# Patient Record
Sex: FEMALE | Race: ASIAN | NOT HISPANIC OR LATINO
[De-identification: names, ages, dates, MRNs, and addresses within clinical notes are randomized per-mention and may not be internally consistent; named-entity substitution may affect disease eponyms.]

---

## 2024-08-27 ENCOUNTER — APPOINTMENT (OUTPATIENT)
Dept: OBGYN | Facility: CLINIC | Age: 39
End: 2024-08-27
Payer: COMMERCIAL

## 2024-08-27 VITALS — DIASTOLIC BLOOD PRESSURE: 78 MMHG | WEIGHT: 171.13 LBS | SYSTOLIC BLOOD PRESSURE: 119 MMHG

## 2024-08-27 DIAGNOSIS — Z85.850 PERSONAL HISTORY OF MALIGNANT NEOPLASM OF THYROID: ICD-10-CM

## 2024-08-27 DIAGNOSIS — Z78.9 OTHER SPECIFIED HEALTH STATUS: ICD-10-CM

## 2024-08-27 DIAGNOSIS — D25.9 LEIOMYOMA OF UTERUS, UNSPECIFIED: ICD-10-CM

## 2024-08-27 DIAGNOSIS — Z34.92 ENCOUNTER FOR SUPERVISION OF NORMAL PREGNANCY, UNSPECIFIED, SECOND TRIMESTER: ICD-10-CM

## 2024-08-27 DIAGNOSIS — Z83.42 FAMILY HISTORY OF FAMILIAL HYPERCHOLESTEROLEMIA: ICD-10-CM

## 2024-08-27 DIAGNOSIS — Z83.3 FAMILY HISTORY OF DIABETES MELLITUS: ICD-10-CM

## 2024-08-27 PROBLEM — Z00.00 ENCOUNTER FOR PREVENTIVE HEALTH EXAMINATION: Status: ACTIVE | Noted: 2024-08-27

## 2024-08-27 LAB
BILIRUB UR QL STRIP: NORMAL
GLUCOSE UR-MCNC: NORMAL
HCG UR QL: 0.2 EU/DL
HGB UR QL STRIP.AUTO: NORMAL
KETONES UR-MCNC: NORMAL
LEUKOCYTE ESTERASE UR QL STRIP: NORMAL
NITRITE UR QL STRIP: NORMAL
PH UR STRIP: 6
PROT UR STRIP-MCNC: NORMAL
SP GR UR STRIP: 1.01

## 2024-08-27 PROCEDURE — 0501F PRENATAL FLOW SHEET: CPT

## 2024-08-27 RX ORDER — ASPIRIN 81 MG/1
81 TABLET, DELAYED RELEASE ORAL
Refills: 0 | Status: ACTIVE | COMMUNITY
Start: 2024-08-27

## 2024-08-27 RX ORDER — CALCIUM CARBONATE/VITAMIN D3 500MG-5MCG
500-5 TABLET ORAL
Refills: 0 | Status: ACTIVE | COMMUNITY
Start: 2024-08-27

## 2024-08-27 RX ORDER — CALCITRIOL 0.25 UG/1
0.25 CAPSULE, LIQUID FILLED ORAL
Refills: 0 | Status: ACTIVE | COMMUNITY
Start: 2024-08-27

## 2024-08-27 RX ORDER — LEVOTHYROXINE SODIUM 0.15 MG/1
150 TABLET ORAL
Refills: 0 | Status: ACTIVE | COMMUNITY
Start: 2024-08-27

## 2024-08-29 ENCOUNTER — NON-APPOINTMENT (OUTPATIENT)
Age: 39
End: 2024-08-29

## 2024-08-29 PROBLEM — D25.9 FIBROID UTERUS: Status: ACTIVE | Noted: 2024-08-29

## 2024-08-29 LAB
ABO + RH PNL BLD: NORMAL
AF-AFP DISCLAIMER: NORMAL
AFP  MOM: 1.28
AFP CONCENTRATION: 53.92 NG/ML
AFP INTERPRETATION: NORMAL
AFP MOM CUT-OFF: 2.5
AFP PERCENTILE: 77.8
AFP SCREENING RESULT: NORMAL
AFTER SCREENING RISK OPEN SPINA BIFIDA: NORMAL
BEFORE SCREENING RISK OPEN SPINA BIFIDA: NORMAL
BLD GP AB SCN SERPL QL: NORMAL
CARBAMAZEPINE?: NO
CURRENT SMOKER: NORMAL
ESTIMATED DUE DATE: NORMAL
EXTREME ANALYTE ALERT: NO
FAMILY HISTORY OPEN SPINA BIFIDA: NORMAL
GESTATIONAL  AGE: NORMAL
GESTATIONAL AGE METHOD: NORMAL
HBV SURFACE AG SER QL: NONREACTIVE
HCV AB SER QL: NONREACTIVE
HCV S/CO RATIO: 0.17 S/CO
HIV1+2 AB SPEC QL IA.RAPID: NONREACTIVE
INSULIN DEPEND DIABETES: NORMAL
MATERNAL WGT: 171.2
MULTIPLE PREGNANCY STATUS: NORMAL
RACE/ETHNICITY: NORMAL
RUBV IGG FLD-ACNC: 3.44 INDEX
RUBV IGG SER-IMP: POSITIVE
T PALLIDUM AB SER QL IA: NEGATIVE
VALPROIC ACID?: NORMAL

## 2024-09-02 ENCOUNTER — TRANSCRIPTION ENCOUNTER (OUTPATIENT)
Age: 39
End: 2024-09-02

## 2024-09-16 ENCOUNTER — APPOINTMENT (OUTPATIENT)
Dept: ANTEPARTUM | Facility: CLINIC | Age: 39
End: 2024-09-16
Payer: COMMERCIAL

## 2024-09-16 ENCOUNTER — ASOB RESULT (OUTPATIENT)
Age: 39
End: 2024-09-16

## 2024-09-16 ENCOUNTER — NON-APPOINTMENT (OUTPATIENT)
Age: 39
End: 2024-09-16

## 2024-09-16 PROCEDURE — 76811 OB US DETAILED SNGL FETUS: CPT

## 2024-09-26 ENCOUNTER — NON-APPOINTMENT (OUTPATIENT)
Age: 39
End: 2024-09-26

## 2024-09-26 ENCOUNTER — APPOINTMENT (OUTPATIENT)
Dept: OBGYN | Facility: CLINIC | Age: 39
End: 2024-09-26
Payer: COMMERCIAL

## 2024-09-26 VITALS
SYSTOLIC BLOOD PRESSURE: 115 MMHG | BODY MASS INDEX: 31.68 KG/M2 | WEIGHT: 172.13 LBS | HEIGHT: 62 IN | DIASTOLIC BLOOD PRESSURE: 73 MMHG

## 2024-09-26 PROCEDURE — 81003 URINALYSIS AUTO W/O SCOPE: CPT | Mod: QW

## 2024-09-26 PROCEDURE — 0502F SUBSEQUENT PRENATAL CARE: CPT

## 2024-09-27 ENCOUNTER — APPOINTMENT (OUTPATIENT)
Dept: ANTEPARTUM | Facility: CLINIC | Age: 39
End: 2024-09-27
Payer: COMMERCIAL

## 2024-09-27 ENCOUNTER — ASOB RESULT (OUTPATIENT)
Age: 39
End: 2024-09-27

## 2024-09-27 PROCEDURE — 76816 OB US FOLLOW-UP PER FETUS: CPT

## 2024-10-17 ENCOUNTER — ASOB RESULT (OUTPATIENT)
Age: 39
End: 2024-10-17

## 2024-10-17 ENCOUNTER — APPOINTMENT (OUTPATIENT)
Dept: ANTEPARTUM | Facility: CLINIC | Age: 39
End: 2024-10-17
Payer: COMMERCIAL

## 2024-10-17 PROCEDURE — 76816 OB US FOLLOW-UP PER FETUS: CPT

## 2024-10-24 ENCOUNTER — APPOINTMENT (OUTPATIENT)
Dept: OBGYN | Facility: CLINIC | Age: 39
End: 2024-10-24
Payer: COMMERCIAL

## 2024-10-24 VITALS
WEIGHT: 180.5 LBS | DIASTOLIC BLOOD PRESSURE: 83 MMHG | SYSTOLIC BLOOD PRESSURE: 124 MMHG | BODY MASS INDEX: 34.08 KG/M2 | HEIGHT: 61 IN

## 2024-10-24 DIAGNOSIS — Z34.92 ENCOUNTER FOR SUPERVISION OF NORMAL PREGNANCY, UNSPECIFIED, SECOND TRIMESTER: ICD-10-CM

## 2024-10-24 DIAGNOSIS — Z34.93 ENCOUNTER FOR SUPERVISION OF NORMAL PREGNANCY, UNSPECIFIED, THIRD TRIMESTER: ICD-10-CM

## 2024-10-24 LAB
BILIRUB UR QL STRIP: NORMAL
GLUCOSE UR-MCNC: NORMAL
HCG UR QL: 0.2 EU/DL
HGB UR QL STRIP.AUTO: NORMAL
KETONES UR-MCNC: NORMAL
LEUKOCYTE ESTERASE UR QL STRIP: NORMAL
NITRITE UR QL STRIP: NORMAL
PH UR STRIP: 7
PROT UR STRIP-MCNC: NORMAL
SP GR UR STRIP: 1.01

## 2024-10-24 PROCEDURE — 81003 URINALYSIS AUTO W/O SCOPE: CPT | Mod: QW

## 2024-10-24 PROCEDURE — 0502F SUBSEQUENT PRENATAL CARE: CPT

## 2024-10-25 LAB
BASOPHILS # BLD AUTO: 0.07 K/UL
BASOPHILS NFR BLD AUTO: 0.6 %
EOSINOPHIL # BLD AUTO: 0.23 K/UL
EOSINOPHIL NFR BLD AUTO: 1.8 %
GLUCOSE 1H P 50 G GLC PO SERPL-MCNC: 125 MG/DL
HCT VFR BLD CALC: 36 %
HGB BLD-MCNC: 11.1 G/DL
HIV1+2 AB SPEC QL IA.RAPID: NONREACTIVE
IMM GRANULOCYTES NFR BLD AUTO: 1 %
LYMPHOCYTES # BLD AUTO: 1.62 K/UL
LYMPHOCYTES NFR BLD AUTO: 12.8 %
MAN DIFF?: NORMAL
MCHC RBC-ENTMCNC: 28.6 PG
MCHC RBC-ENTMCNC: 30.8 GM/DL
MCV RBC AUTO: 92.8 FL
MONOCYTES # BLD AUTO: 0.62 K/UL
MONOCYTES NFR BLD AUTO: 4.9 %
NEUTROPHILS # BLD AUTO: 9.96 K/UL
NEUTROPHILS NFR BLD AUTO: 78.9 %
PLATELET # BLD AUTO: 247 K/UL
RBC # BLD: 3.88 M/UL
RBC # FLD: 13.2 %
T PALLIDUM AB SER QL IA: NEGATIVE
T4 FREE SERPL-MCNC: 1.4 NG/DL
TSH SERPL-ACNC: 0.34 UIU/ML
WBC # FLD AUTO: 12.62 K/UL

## 2024-10-28 ENCOUNTER — TRANSCRIPTION ENCOUNTER (OUTPATIENT)
Age: 39
End: 2024-10-28

## 2024-10-29 ENCOUNTER — NON-APPOINTMENT (OUTPATIENT)
Age: 39
End: 2024-10-29

## 2024-10-30 ENCOUNTER — TRANSCRIPTION ENCOUNTER (OUTPATIENT)
Age: 39
End: 2024-10-30

## 2024-11-14 ENCOUNTER — TRANSCRIPTION ENCOUNTER (OUTPATIENT)
Age: 39
End: 2024-11-14

## 2024-11-27 ENCOUNTER — APPOINTMENT (OUTPATIENT)
Dept: OBGYN | Facility: CLINIC | Age: 39
End: 2024-11-27

## 2024-11-27 ENCOUNTER — APPOINTMENT (OUTPATIENT)
Dept: ANTEPARTUM | Facility: CLINIC | Age: 39
End: 2024-11-27
Payer: COMMERCIAL

## 2024-11-27 ENCOUNTER — ASOB RESULT (OUTPATIENT)
Age: 39
End: 2024-11-27

## 2024-11-27 VITALS
HEIGHT: 61 IN | SYSTOLIC BLOOD PRESSURE: 114 MMHG | WEIGHT: 188 LBS | BODY MASS INDEX: 35.5 KG/M2 | DIASTOLIC BLOOD PRESSURE: 77 MMHG

## 2024-11-27 PROCEDURE — 76816 OB US FOLLOW-UP PER FETUS: CPT

## 2024-11-27 PROCEDURE — 90715 TDAP VACCINE 7 YRS/> IM: CPT

## 2024-11-27 PROCEDURE — 90471 IMMUNIZATION ADMIN: CPT

## 2024-11-27 PROCEDURE — 76819 FETAL BIOPHYS PROFIL W/O NST: CPT | Mod: 59

## 2024-12-13 ENCOUNTER — NON-APPOINTMENT (OUTPATIENT)
Age: 39
End: 2024-12-13

## 2024-12-13 ENCOUNTER — TRANSCRIPTION ENCOUNTER (OUTPATIENT)
Age: 39
End: 2024-12-13

## 2024-12-16 ENCOUNTER — APPOINTMENT (OUTPATIENT)
Dept: OBGYN | Facility: CLINIC | Age: 39
End: 2024-12-16
Payer: COMMERCIAL

## 2024-12-16 VITALS
HEIGHT: 61 IN | DIASTOLIC BLOOD PRESSURE: 73 MMHG | WEIGHT: 193 LBS | BODY MASS INDEX: 36.44 KG/M2 | SYSTOLIC BLOOD PRESSURE: 118 MMHG

## 2024-12-16 DIAGNOSIS — Z23 ENCOUNTER FOR IMMUNIZATION: ICD-10-CM

## 2024-12-16 DIAGNOSIS — Z34.93 ENCOUNTER FOR SUPERVISION OF NORMAL PREGNANCY, UNSPECIFIED, THIRD TRIMESTER: ICD-10-CM

## 2024-12-16 PROCEDURE — 0502F SUBSEQUENT PRENATAL CARE: CPT

## 2024-12-16 PROCEDURE — 90471 IMMUNIZATION ADMIN: CPT

## 2024-12-16 PROCEDURE — 90678 RSV VACC PREF BIVALENT IM: CPT

## 2024-12-18 ENCOUNTER — NON-APPOINTMENT (OUTPATIENT)
Age: 39
End: 2024-12-18

## 2024-12-27 ENCOUNTER — APPOINTMENT (OUTPATIENT)
Dept: ANTEPARTUM | Facility: CLINIC | Age: 39
End: 2024-12-27
Payer: COMMERCIAL

## 2024-12-27 ENCOUNTER — ASOB RESULT (OUTPATIENT)
Age: 39
End: 2024-12-27

## 2024-12-27 PROCEDURE — 76818 FETAL BIOPHYS PROFILE W/NST: CPT | Mod: 59

## 2024-12-27 PROCEDURE — 76816 OB US FOLLOW-UP PER FETUS: CPT

## 2025-01-03 ENCOUNTER — ASOB RESULT (OUTPATIENT)
Age: 40
End: 2025-01-03

## 2025-01-03 ENCOUNTER — APPOINTMENT (OUTPATIENT)
Dept: OBGYN | Facility: CLINIC | Age: 40
End: 2025-01-03
Payer: COMMERCIAL

## 2025-01-03 ENCOUNTER — APPOINTMENT (OUTPATIENT)
Dept: ANTEPARTUM | Facility: CLINIC | Age: 40
End: 2025-01-03
Payer: COMMERCIAL

## 2025-01-03 VITALS
WEIGHT: 192.01 LBS | BODY MASS INDEX: 36.28 KG/M2 | SYSTOLIC BLOOD PRESSURE: 128 MMHG | DIASTOLIC BLOOD PRESSURE: 80 MMHG

## 2025-01-03 PROCEDURE — 0502F SUBSEQUENT PRENATAL CARE: CPT

## 2025-01-03 PROCEDURE — 76818 FETAL BIOPHYS PROFILE W/NST: CPT

## 2025-01-03 PROCEDURE — 59426 ANTEPARTUM CARE ONLY: CPT

## 2025-01-06 ENCOUNTER — NON-APPOINTMENT (OUTPATIENT)
Age: 40
End: 2025-01-06

## 2025-01-06 ENCOUNTER — INPATIENT (INPATIENT)
Facility: HOSPITAL | Age: 40
LOS: 3 days | Discharge: HOME | End: 2025-01-10
Attending: SPECIALIST | Admitting: SPECIALIST
Payer: COMMERCIAL

## 2025-01-06 VITALS
DIASTOLIC BLOOD PRESSURE: 69 MMHG | HEART RATE: 82 BPM | SYSTOLIC BLOOD PRESSURE: 112 MMHG | TEMPERATURE: 98 F | OXYGEN SATURATION: 99 % | RESPIRATION RATE: 18 BRPM

## 2025-01-06 DIAGNOSIS — Z34.80 ENCOUNTER FOR SUPERVISION OF OTHER NORMAL PREGNANCY, UNSPECIFIED TRIMESTER: ICD-10-CM

## 2025-01-06 DIAGNOSIS — O26.899 OTHER SPECIFIED PREGNANCY RELATED CONDITIONS, UNSPECIFIED TRIMESTER: ICD-10-CM

## 2025-01-06 DIAGNOSIS — Z98.890 OTHER SPECIFIED POSTPROCEDURAL STATES: Chronic | ICD-10-CM

## 2025-01-06 LAB
ALBUMIN SERPL ELPH-MCNC: 3.4 G/DL — SIGNIFICANT CHANGE UP (ref 3.3–5)
ALP SERPL-CCNC: 143 U/L — HIGH (ref 40–120)
ALT FLD-CCNC: 223 U/L — HIGH (ref 10–45)
ANION GAP SERPL CALC-SCNC: 13 MMOL/L — SIGNIFICANT CHANGE UP (ref 5–17)
AST SERPL-CCNC: 66 U/L — HIGH (ref 10–40)
BILIRUB SERPL-MCNC: 0.5 MG/DL — SIGNIFICANT CHANGE UP (ref 0.2–1.2)
BLD GP AB SCN SERPL QL: NEGATIVE — SIGNIFICANT CHANGE UP
BUN SERPL-MCNC: 9 MG/DL — SIGNIFICANT CHANGE UP (ref 7–23)
CALCIUM SERPL-MCNC: 9.1 MG/DL — SIGNIFICANT CHANGE UP (ref 8.4–10.5)
CHLORIDE SERPL-SCNC: 102 MMOL/L — SIGNIFICANT CHANGE UP (ref 96–108)
CO2 SERPL-SCNC: 20 MMOL/L — LOW (ref 22–31)
CREAT SERPL-MCNC: 0.44 MG/DL — LOW (ref 0.5–1.3)
EGFR: 125 ML/MIN/1.73M2 — SIGNIFICANT CHANGE UP
GLUCOSE SERPL-MCNC: 107 MG/DL — HIGH (ref 70–99)
GP B STREP DNA SPEC QL NAA+PROBE: DETECTED
HCT VFR BLD CALC: 36.2 % — SIGNIFICANT CHANGE UP (ref 34.5–45)
HGB BLD-MCNC: 11.7 G/DL — SIGNIFICANT CHANGE UP (ref 11.5–15.5)
MCHC RBC-ENTMCNC: 28.5 PG — SIGNIFICANT CHANGE UP (ref 27–34)
MCHC RBC-ENTMCNC: 32.3 G/DL — SIGNIFICANT CHANGE UP (ref 32–36)
MCV RBC AUTO: 88.3 FL — SIGNIFICANT CHANGE UP (ref 80–100)
NRBC # BLD: 0 /100 WBCS — SIGNIFICANT CHANGE UP (ref 0–0)
PLATELET # BLD AUTO: 263 K/UL — SIGNIFICANT CHANGE UP (ref 150–400)
POTASSIUM SERPL-MCNC: 4.3 MMOL/L — SIGNIFICANT CHANGE UP (ref 3.5–5.3)
POTASSIUM SERPL-SCNC: 4.3 MMOL/L — SIGNIFICANT CHANGE UP (ref 3.5–5.3)
PROT SERPL-MCNC: 7.2 G/DL — SIGNIFICANT CHANGE UP (ref 6–8.3)
RBC # BLD: 4.1 M/UL — SIGNIFICANT CHANGE UP (ref 3.8–5.2)
RBC # FLD: 13.5 % — SIGNIFICANT CHANGE UP (ref 10.3–14.5)
RH IG SCN BLD-IMP: POSITIVE — SIGNIFICANT CHANGE UP
RH IG SCN BLD-IMP: POSITIVE — SIGNIFICANT CHANGE UP
SODIUM SERPL-SCNC: 135 MMOL/L — SIGNIFICANT CHANGE UP (ref 135–145)
SOURCE GBS: NORMAL
WBC # BLD: 11.91 K/UL — HIGH (ref 3.8–10.5)
WBC # FLD AUTO: 11.91 K/UL — HIGH (ref 3.8–10.5)

## 2025-01-06 RX ORDER — SODIUM CHLORIDE 9 MG/ML
1000 INJECTION, SOLUTION INTRAVENOUS
Refills: 0 | Status: DISCONTINUED | OUTPATIENT
Start: 2025-01-06 | End: 2025-01-08

## 2025-01-06 RX ORDER — FAMOTIDINE 20 MG/1
20 TABLET, FILM COATED ORAL ONCE
Refills: 0 | Status: DISCONTINUED | OUTPATIENT
Start: 2025-01-06 | End: 2025-01-06

## 2025-01-06 RX ORDER — AMPICILLIN TRIHYDRATE 125 MG/5ML
1 SUSPENSION, RECONSTITUTED, ORAL (ML) ORAL EVERY 4 HOURS
Refills: 0 | Status: DISCONTINUED | OUTPATIENT
Start: 2025-01-06 | End: 2025-01-08

## 2025-01-06 RX ORDER — CITRIC ACID/SODIUM CITRATE 334-500MG
30 SOLUTION, ORAL ORAL ONCE
Refills: 0 | Status: DISCONTINUED | OUTPATIENT
Start: 2025-01-06 | End: 2025-01-06

## 2025-01-06 RX ORDER — CHLORHEXIDINE GLUCONATE 1.2 MG/ML
1 RINSE ORAL DAILY
Refills: 0 | Status: DISCONTINUED | OUTPATIENT
Start: 2025-01-06 | End: 2025-01-06

## 2025-01-06 RX ORDER — SODIUM CHLORIDE 9 MG/ML
1000 INJECTION, SOLUTION INTRAVENOUS
Refills: 0 | Status: DISCONTINUED | OUTPATIENT
Start: 2025-01-06 | End: 2025-01-06

## 2025-01-06 RX ORDER — OXYTOCIN IN 5 % DEXTROSE 20/500ML
167 PLASTIC BAG, INJECTION (ML) INTRAVENOUS
Qty: 30 | Refills: 0 | Status: DISCONTINUED | OUTPATIENT
Start: 2025-01-06 | End: 2025-01-08

## 2025-01-06 RX ORDER — CITRIC ACID/SODIUM CITRATE 334-500MG
15 SOLUTION, ORAL ORAL EVERY 6 HOURS
Refills: 0 | Status: DISCONTINUED | OUTPATIENT
Start: 2025-01-06 | End: 2025-01-08

## 2025-01-06 RX ORDER — CHLORHEXIDINE GLUCONATE 1.2 MG/ML
1 RINSE ORAL DAILY
Refills: 0 | Status: DISCONTINUED | OUTPATIENT
Start: 2025-01-06 | End: 2025-01-08

## 2025-01-06 RX ORDER — AMPICILLIN TRIHYDRATE 125 MG/5ML
2 SUSPENSION, RECONSTITUTED, ORAL (ML) ORAL ONCE
Refills: 0 | Status: COMPLETED | OUTPATIENT
Start: 2025-01-06 | End: 2025-01-06

## 2025-01-06 RX ADMIN — SODIUM CHLORIDE 125 MILLILITER(S): 9 INJECTION, SOLUTION INTRAVENOUS at 18:11

## 2025-01-06 RX ADMIN — SODIUM CHLORIDE 125 MILLILITER(S): 9 INJECTION, SOLUTION INTRAVENOUS at 18:10

## 2025-01-06 RX ADMIN — Medication 108 GRAM(S): at 22:44

## 2025-01-06 RX ADMIN — Medication 200 GRAM(S): at 18:05

## 2025-01-06 NOTE — OB RN PATIENT PROFILE - FALL HARM RISK - UNIVERSAL INTERVENTIONS
Bed in lowest position, wheels locked, appropriate side rails in place/Call bell, personal items and telephone in reach/Instruct patient to call for assistance before getting out of bed or chair/Non-slip footwear when patient is out of bed/[place] to call system/Physically safe environment - no spills, clutter or unnecessary equipment/Purposeful Proactive Rounding/Room/bathroom lighting operational, light cord in reach

## 2025-01-06 NOTE — OB PROVIDER TRIAGE NOTE - NSHPPHYSICALEXAM_GEN_ALL_CORE
Objective  – Vital Signs WNL    – PE:   CV: RRR  Pulm: breathing comfortably on RA  Abd: gravid, nontender  Extr: moving all extremities with ease    – FHT: baseline 130bpm, mod variability, +accels, -decels  – [place]: acontractile  – EFW: 3255g by sono  – Sono: vertex, BPP 8/8, AKIL 17.4

## 2025-01-06 NOTE — OB RN PATIENT PROFILE - NS_SISCREENINGSR_GEN_ALL_ED
[FreeTextEntry1] : Elevated BP: May be due to mucinex. Discussed limiting salt, exercise and avoiding NSAIDs, cold medication. Follow-up 2 weeks. Will start medications if BP remains high.
Negative

## 2025-01-06 NOTE — OB RN PATIENT PROFILE - NSICDXPASTSURGICALHX_GEN_ALL_CORE_FT
Harvey Campbell MD routed conversation to [facility] Nurse Msg Pool 42 minutes ago (12:08 PM)     Harvey Campbell MD 42 minutes ago (12:08 PM)           Okay to hold Eliquis 1 day before procedure (Cr Clearance is 56 based on BMP 6/3/2021) and resume 24 hours after her procedure per guidelines noted in the encounter below for Eliquis            PAST SURGICAL HISTORY:  H/O thyroidectomy

## 2025-01-06 NOTE — OB PROVIDER H&P - ATTENDING COMMENTS
at 37+wks presents w dec FM and itching on palms and back.  PNC w no complications.  Fetal testing was reassuring in L&D but LFTs were noted to be significantly elevated.  Recommend delivery for presumed cholestasis.  Pt was considering primary elective C/S.  Discussed options of induction vs C/S.  Pt informed that vaginal delivery has lower risks compared to C/S.  Explained that she is at higher risk for requiring C/S due to parity, age and GA.  All questions answered.  Pt desires induction.  Will proceed w PO cytotec and cervical balloon when possible.

## 2025-01-06 NOTE — OB PROVIDER TRIAGE NOTE - HISTORY OF PRESENT ILLNESS
OB Provider Triage     Subjective  HPI: 39yoF  @37w4d presents with decFM. Pt report dec FM since 6a this morning. Pt reports itchy palms x1 week.   -LOF. -CTXs. -VB. Pt denies any other concerns.    – PNC: Denies prenatal issues. GBS positive.  EFW 3255g by niko.  – OBHx: TOP with D&Cx1  – GynHx: denies  – PMH: thyroid cancer sp thyroidectomy   – PSH: thyroidectomy c/b parathyroid injury  – Psych: denies   – Social: denies   – Meds: PNV, calcium, calcitriol    – Allergies: NKDA  – Will accept blood transfusions? Yes     OB Provider Triage     Subjective  HPI: 39yoF  @37w4d presents with decFM. Pt report dec FM since 6a this morning. Pt reports itchy palms x1 week.   -LOF. -CTXs. -VB. Pt denies any other concerns.    – PNC: Denies prenatal issues. GBS positive.  EFW 3255g by niko.  – OBHx: TOP with D&Cx1  – GynHx: denies  – PMH: thyroid cancer sp thyroidectomy   – PSH: thyroidectomy c/b parathyroid injury, D&Cx1  – Psych: denies   – Social: denies   – Meds: PNV, calcium, calcitriol    – Allergies: NKDA  – Will accept blood transfusions? Yes     OB Provider Triage     Subjective  HPI: 39yoF  @37w4d presents with decFM. Pt report dec FM since 6a this morning. Pt reports itchy palms x1 week.   -LOF. -CTXs. -VB. Pt denies any other concerns.    – PNC: Denies prenatal issues. GBS positive.  EFW 3255g by niko.  – OBHx: TOP with D&Cx1  – GynHx: denies  – PMH: thyroid cancer sp thyroidectomy   – PSH: thyroidectomy c/b parathyroid injury, D&Cx1  – Psych: denies   – Social: denies   – Meds: PNV, calcium, calcitriol, Synthroid    – Allergies: NKDA  – Will accept blood transfusions? Yes

## 2025-01-06 NOTE — OB PROVIDER H&P - HISTORY OF PRESENT ILLNESS
OB Provider Triage     Subjective  HPI: 39yoF  @37w4d presents with decFM. Pt report dec FM since 6a this morning. Pt reports itchy palms x1 week.   -LOF. -CTXs. -VB. Pt denies any other concerns.    – PNC: Denies prenatal issues. GBS positive.  EFW 3255g by niko.  – OBHx: TOP with D&Cx1  – GynHx: denies  – PMH: thyroid cancer sp thyroidectomy   – PSH: thyroidectomy c/b parathyroid injury, D&Cx1  – Psych: denies   – Social: denies   – Meds: PNV, calcium, calcitriol, Synthroid    – Allergies: NKDA  – Will accept blood transfusions? Yes     HPI: 39yoF  @37w4d presents with IOL 2/2 decFM. Pt report dec FM since 6a this morning. Pt reports itchy palms x1 week. Pt has elevated LFTs and being induced for cholestasis.  -LOF. -CTXs. -VB. Pt denies any other concerns.    – PNC: Denies prenatal issues. GBS positive.  EFW 3255g by niko.  – OBHx: TOP with D&Cx1  – GynHx: denies  – PMH: thyroid cancer sp thyroidectomy   – PSH: thyroidectomy c/b parathyroid injury, D&Cx1  – Psych: denies   – Social: denies   – Meds: PNV, calcium, calcitriol, Synthroid    – Allergies: NKDA  – Will accept blood transfusions? Yes

## 2025-01-06 NOTE — OB PROVIDER H&P - NSLOWPPHRISK_OBGYN_A_OB
No previous uterine incision/[place] Pregnancy/Less than or equal to 4 previous vaginal births/No known bleeding disorder/No history of postpartum hemorrhage/No other PPH risks indicated

## 2025-01-06 NOTE — OB PROVIDER H&P - ASSESSMENT
39 year old [id number] at 37+4 presents with dec FM of one day duration in the setting of persistent pruritis of palms and soles. In triage patient found to have transaminitis. Patient to be kept for delivery 2/2 presumed ICP. At this time patient leaning toward primary c/s, but still wants to decide with her family    -NPO  -Consents signed  -LR@125  -Admission labs  -NPO    discussed w Dr Andrew Vazquez PGY4 A/P: Pt is a 38yo  at 37w4d here for IOL 2/2 transaminitis and presumed ICP       - Admit to L&D  - Routine labs   - EFM/TOCO: resuscitative measures prn  - GBS (+): Ampicillin ordered  - PO cytotec as IOL agent  - Place cervical villanueva balloon when able  - Anesthesia consult for epidural prn  - Expect     d/w Dr. Andrew Baldwin, PA-C

## 2025-01-06 NOTE — OB RN TRIAGE NOTE - FALL HARM RISK - UNIVERSAL INTERVENTIONS
With your first blood pressure reading being mildly elevated we will continue to watch your blood pressure.  The second 1 was normal.    With history of anemia after your last delivery let us check your hemoglobin every month.  We will check it today again at 34 weeks and 38 weeks.    Continue your prenatal vitamin with iron and high iron diet.  If the hemoglobin goes under 11 we will start an iron supplement.    Phone number to call to set up Covid vaccine is [phone].  There are not a lot of studies in pregnant women but it is up to you if you wish to get the shot.     To see outpatient lactation prenatally.    Since you will be advanced maternal age at time of delivery we will get an ultrasound with biophysical profile at 36 weeks and starting at 36 weeks we will do a nonstress test monitoring at our clinic weekly.      Patient Education   HEALTHY PREGNANCY CARE: 30-34 WEEKS PREGNANT    You have made it to the final months of your pregnancy. By now, your baby is starting to fill out with some fat under his skin, fuzzy hair on his shoulders, and is gaining 4 to 6 ounces per week.    Discuss any travel plans with your midwife or physician.    Review possible changes in sexuality during later pregnancy and discuss these with your midwife or physician, as well as your partner. Alternative love-making positions may be more comfortable.    Discuss labor and delivery issues with your midwife or physician. If you had a  birth in the past, discuss a trial of labor with your midwife or physician. He or she may ask that you sign a consent form, if you wish to have a vaginal birth after  (). Ask your midwife or physician to explain your options for managing pain during your labor and delivery. Sometimes, during the birth process, an episiotomy may need to be cut in the vagina to make the opening bigger or let the baby come out quicker. You may want to discuss the episiotomy and how often it is  needed with your midwife or physician.    Plan for your baby's care by selecting a child health care provider (Family physician, Pediatrician, or Pediatric Nurse Practitioner). Practice installing an infant car seat correctly in the car. Ask for car seat information as needed and make sure it is safe and will work in the car your baby will ride in. You will need a car seat to bring your baby home from the hospital. Check the procedure for adding your baby to your health care plan. Review your decision about circumcision and ask for any information you need. As you buy and receive items for your baby, don't put a baby walker on your list. Walkers can be dangerous and can cause serious injury to your child. A safer option is a saucer-type play station, since it doesn't allow baby to travel across the floor.    Discuss your choices and plans for birth control with your midwife or physician. Women who are breastfeeding can still become pregnant. Use a birth control method if you want to lower your pregnancy risk. Talk to your midwife or physician if you are considering permanent birth control, such as tubal ligation or Essure. You may need to complete a consent form 30 days prior to delivery in order to have this done after you deliver.    Continue to watch for signs and symptoms of preeclampsia:     Sudden swelling of your face, hands, or feet     New vision problems such as blurring, double vision, or flashing lights    A severe headache not relieved with acetaminophen (Tylenol)    Sharp or stabbing pain in your right or middle upper abdomen    Watch for signs and symptoms of premature labor:     Regular contractions. This means having about 6 or more within 1 hour, even after you have had a glass of water and are resting.     A backache that starts and stops regularly.    An increase or change in vaginal discharge, such as heavy, mucus-like, watery, or bloody discharge.     Your water breaks or leaks.    If you have  any of the above symptoms or any other concerns, call your provider or their clinic staff at [facility] at Phone: [phone]. If it's after clinic hours, physician patients should call the [facility] at [phone] ([phone]); midwife patients should call their answering service at [phone].    How can you care for yourself at home?   You can refer to the Starting Out Right book or find it online at [email or web address] or [email or web address]/[email or web address]-out-[email or web address]-starting-out-right.html#p=[email or web address]     You can sign up for a weekly parenting e-mail that gives support, tips and advice from health care professionals that starts with pregnancy and continues through the toddler years. To register, go to [email or web address] at any time during your pregnancy.      Bed in lowest position, wheels locked, appropriate side rails in place/Call bell, personal items and telephone in reach/Instruct patient to call for assistance before getting out of bed or chair/Non-slip footwear when patient is out of bed/[place] to call system/Physically safe environment - no spills, clutter or unnecessary equipment/Purposeful Proactive Rounding/Room/bathroom lighting operational, light cord in reach

## 2025-01-06 NOTE — OB PROVIDER H&P - NSLOWPPHRISK_OBGYN_A_OB_CAL
[Joint Pain] : joint pain [Negative] : Heme/Lymph [Joint Stiffness] : no joint stiffness [Joint Swelling] : no joint swelling 6

## 2025-01-06 NOTE — OB PROVIDER H&P - NSHPPHYSICALEXAM_GEN_ALL_CORE
Objective  – Vital Signs WNL    – PE:   CV: RRR  Pulm: breathing comfortably on RA  Abd: gravid, nontender  Extr: moving all extremities with ease    – FHT: baseline 130bpm, mod variability, +accels, -decels  – [place]: acontractile  – EFW: 3255g by sono  – Sono: vertex, BPP 8/8, AKIL 17.4 Objective  – Vital Signs WNL    – PE:   CV: RRR  Pulm: breathing comfortably on RA  Abd: gravid, nontender  Extr: moving all extremities with ease    – FHT: baseline 130bpm, mod variability, +accels, -decels  – [place]: acontractile  – EFW: 3255g by sono  – Sono: vertex, BPP 8/8, AKIL 17.4  VE: 0/50/-3  BSUS: vertex

## 2025-01-06 NOTE — OB PROVIDER TRIAGE NOTE - NSOBPROVIDERNOTE_OBGYN_ALL_OB_FT
Assessment  39yoF  @37w4d presents with decreased FM and itchy palms.    Plan  - BPP , AKIL 17.4  - FHT reassuring, obtain NST  - Obtain bile acids/CMP to assess cholestasis     Patient discussed with attending physician, Dr. Andrew Anderson MD PGY2 Assessment  39yoF  @37w4d presents with decreased FM and itchy palms.    Plan  - BPP /, AKIL 17.4  - FHT reassuring, obtain NST  - Obtain bile acids/CMP to assess cholestasis     Patient discussed with attending physician, Dr. Andrew Anderson MD PGY2    Addendum  38yo  at 37wks c/o dec FM since this AM and itching on back and palms.  Fetal testing reassuring.  Pt considering primary elective C/S.  Discussed risks and benefits of C/S vs trial of labor.  Explained that vaginal delivery is safer then C/S but explained that pt at increased risk of requiring C/S due to AMA and parity.  Pt will consider options.  Explained that bile acid results will not be available for several days.  If LFTs are WNL will plan for D/C home w expectant mgt.  If bile acids are elevated would consider delivery for presumed cholestasis.    ALFREDO Morales

## 2025-01-07 LAB — T PALLIDUM AB TITR SER: NEGATIVE — SIGNIFICANT CHANGE UP

## 2025-01-07 RX ORDER — DIPHENHYDRAMINE HCL 25 MG
25 TABLET ORAL ONCE
Refills: 0 | Status: COMPLETED | OUTPATIENT
Start: 2025-01-07 | End: 2025-01-07

## 2025-01-07 RX ORDER — CALCITRIOL 0.5 UG/1
0.25 CAPSULE, LIQUID FILLED ORAL DAILY
Refills: 0 | Status: DISCONTINUED | OUTPATIENT
Start: 2025-01-07 | End: 2025-01-10

## 2025-01-07 RX ORDER — LEVOTHYROXINE SODIUM 175 UG/1
150 TABLET ORAL DAILY
Refills: 0 | Status: DISCONTINUED | OUTPATIENT
Start: 2025-01-07 | End: 2025-01-09

## 2025-01-07 RX ORDER — OXYTOCIN IN 5 % DEXTROSE 20/500ML
4 PLASTIC BAG, INJECTION (ML) INTRAVENOUS
Qty: 30 | Refills: 0 | Status: DISCONTINUED | OUTPATIENT
Start: 2025-01-07 | End: 2025-01-08

## 2025-01-07 RX ADMIN — Medication 108 GRAM(S): at 11:43

## 2025-01-07 RX ADMIN — Medication 25 MILLIGRAM(S): at 07:24

## 2025-01-07 RX ADMIN — Medication 4 MILLIUNIT(S)/MIN: at 16:32

## 2025-01-07 RX ADMIN — Medication 108 GRAM(S): at 20:05

## 2025-01-07 RX ADMIN — Medication 108 GRAM(S): at 03:10

## 2025-01-07 RX ADMIN — Medication 108 GRAM(S): at 15:47

## 2025-01-07 RX ADMIN — Medication 108 GRAM(S): at 07:28

## 2025-01-07 RX ADMIN — LEVOTHYROXINE SODIUM 150 MICROGRAM(S): 175 TABLET ORAL at 06:00

## 2025-01-08 DIAGNOSIS — Z34.92 ENCOUNTER FOR SUPERVISION OF NORMAL PREGNANCY, UNSPECIFIED, SECOND TRIMESTER: ICD-10-CM

## 2025-01-08 DIAGNOSIS — Z34.93 ENCOUNTER FOR SUPERVISION OF NORMAL PREGNANCY, UNSPECIFIED, THIRD TRIMESTER: ICD-10-CM

## 2025-01-08 LAB — BILE AC FLD-MCNC: 14.1 UMOL/L — HIGH (ref 0–10)

## 2025-01-08 PROCEDURE — 59410 OBSTETRICAL CARE: CPT

## 2025-01-08 RX ORDER — SODIUM CHLORIDE 9 MG/ML
3 INJECTION, SOLUTION INTRAMUSCULAR; INTRAVENOUS; SUBCUTANEOUS EVERY 8 HOURS
Refills: 0 | Status: DISCONTINUED | OUTPATIENT
Start: 2025-01-08 | End: 2025-01-10

## 2025-01-08 RX ORDER — OXYTOCIN IN 5 % DEXTROSE 20/500ML
167 PLASTIC BAG, INJECTION (ML) INTRAVENOUS
Qty: 30 | Refills: 0 | Status: DISCONTINUED | OUTPATIENT
Start: 2025-01-08 | End: 2025-01-10

## 2025-01-08 RX ORDER — IBUPROFEN 200 MG
600 TABLET ORAL EVERY 6 HOURS
Refills: 0 | Status: DISCONTINUED | OUTPATIENT
Start: 2025-01-08 | End: 2025-01-10

## 2025-01-08 RX ORDER — LANOLIN
1 OINTMENT (GRAM) TOPICAL EVERY 6 HOURS
Refills: 0 | Status: DISCONTINUED | OUTPATIENT
Start: 2025-01-08 | End: 2025-01-10

## 2025-01-08 RX ORDER — OXYCODONE HCL 15 MG
5 TABLET ORAL ONCE
Refills: 0 | Status: DISCONTINUED | OUTPATIENT
Start: 2025-01-08 | End: 2025-01-10

## 2025-01-08 RX ORDER — MAGNESIUM HYDROXIDE 400 MG/5ML
30 SUSPENSION, ORAL (FINAL DOSE FORM) ORAL
Refills: 0 | Status: DISCONTINUED | OUTPATIENT
Start: 2025-01-08 | End: 2025-01-10

## 2025-01-08 RX ORDER — CLOSTRIDIUM TETANI TOXOID ANTIGEN (FORMALDEHYDE INACTIVATED), CORYNEBACTERIUM DIPHTHERIAE TOXOID ANTIGEN (FORMALDEHYDE INACTIVATED), BORDETELLA PERTUSSIS TOXOID ANTIGEN (GLUTARALDEHYDE INACTIVATED), BORDETELLA PERTUSSIS FILAMENTOUS HEMAGGLUTININ ANTIGEN (FORMALDEHYDE INACTIVATED), BORDETELLA PERTUSSIS PERTACTIN ANTIGEN, AND BORDETELLA PERTUSSIS FIMBRIAE 2/3 ANTIGEN 5; 2; 2.5; 5; 3; 5 [LF]/.5ML; [LF]/.5ML; UG/.5ML; UG/.5ML; UG/.5ML; UG/.5ML
0.5 INJECTION, SUSPENSION INTRAMUSCULAR ONCE
Refills: 0 | Status: DISCONTINUED | OUTPATIENT
Start: 2025-01-08 | End: 2025-01-10

## 2025-01-08 RX ORDER — KETOROLAC TROMETHAMINE 30 MG/ML
30 INJECTION INTRAMUSCULAR; INTRAVENOUS ONCE
Refills: 0 | Status: DISCONTINUED | OUTPATIENT
Start: 2025-01-08 | End: 2025-01-08

## 2025-01-08 RX ORDER — HYDROCORTISONE 1 %
1 CREAM (GRAM) TOPICAL EVERY 6 HOURS
Refills: 0 | Status: DISCONTINUED | OUTPATIENT
Start: 2025-01-08 | End: 2025-01-10

## 2025-01-08 RX ORDER — PNV/FERROUS FUM/DOCUSATE/FOLIC 90-50-1MG
1 TABLET, EXTENDED RELEASE ORAL DAILY
Refills: 0 | Status: DISCONTINUED | OUTPATIENT
Start: 2025-01-08 | End: 2025-01-10

## 2025-01-08 RX ORDER — ONDANSETRON 4 MG/1
4 TABLET ORAL ONCE
Refills: 0 | Status: COMPLETED | OUTPATIENT
Start: 2025-01-08 | End: 2025-01-08

## 2025-01-08 RX ORDER — ACETAMINOPHEN 80 MG/.8ML
975 SOLUTION/ DROPS ORAL
Refills: 0 | Status: DISCONTINUED | OUTPATIENT
Start: 2025-01-08 | End: 2025-01-10

## 2025-01-08 RX ORDER — DIPHENHYDRAMINE HCL 25 MG
25 TABLET ORAL EVERY 6 HOURS
Refills: 0 | Status: DISCONTINUED | OUTPATIENT
Start: 2025-01-08 | End: 2025-01-10

## 2025-01-08 RX ORDER — OXYCODONE HCL 15 MG
5 TABLET ORAL
Refills: 0 | Status: DISCONTINUED | OUTPATIENT
Start: 2025-01-08 | End: 2025-01-10

## 2025-01-08 RX ORDER — BENZOCAINE/MENTH/CETYLPYRD CL 15 MG-4 MG
1 LOZENGE MUCOUS MEMBRANE EVERY 6 HOURS
Refills: 0 | Status: DISCONTINUED | OUTPATIENT
Start: 2025-01-08 | End: 2025-01-10

## 2025-01-08 RX ORDER — SODIUM CHLORIDE 9 MG/ML
500 INJECTION, SOLUTION INTRAVENOUS ONCE
Refills: 0 | Status: COMPLETED | OUTPATIENT
Start: 2025-01-08 | End: 2025-01-08

## 2025-01-08 RX ORDER — WITCH HAZEL 0.5 ML/ML
1 SOLUTION TOPICAL EVERY 4 HOURS
Refills: 0 | Status: DISCONTINUED | OUTPATIENT
Start: 2025-01-08 | End: 2025-01-10

## 2025-01-08 RX ORDER — SIMETHICONE 125 MG/1
80 CAPSULE, LIQUID FILLED ORAL EVERY 4 HOURS
Refills: 0 | Status: DISCONTINUED | OUTPATIENT
Start: 2025-01-08 | End: 2025-01-10

## 2025-01-08 RX ORDER — SODIUM CHLORIDE 9 MG/ML
1000 INJECTION, SOLUTION INTRAVENOUS ONCE
Refills: 0 | Status: COMPLETED | OUTPATIENT
Start: 2025-01-08 | End: 2025-01-08

## 2025-01-08 RX ORDER — IBUPROFEN 200 MG
600 TABLET ORAL EVERY 6 HOURS
Refills: 0 | Status: COMPLETED | OUTPATIENT
Start: 2025-01-08 | End: 2025-12-07

## 2025-01-08 RX ADMIN — Medication 108 GRAM(S): at 08:23

## 2025-01-08 RX ADMIN — Medication 30 MILLILITER(S): at 23:54

## 2025-01-08 RX ADMIN — Medication 108 GRAM(S): at 04:34

## 2025-01-08 RX ADMIN — CALCITRIOL 0.25 MICROGRAM(S): 0.5 CAPSULE, LIQUID FILLED ORAL at 00:11

## 2025-01-08 RX ADMIN — LEVOTHYROXINE SODIUM 150 MICROGRAM(S): 175 TABLET ORAL at 08:22

## 2025-01-08 RX ADMIN — SODIUM CHLORIDE 3 MILLILITER(S): 9 INJECTION, SOLUTION INTRAMUSCULAR; INTRAVENOUS; SUBCUTANEOUS at 22:58

## 2025-01-08 RX ADMIN — SODIUM CHLORIDE 1000 MILLILITER(S): 9 INJECTION, SOLUTION INTRAVENOUS at 04:30

## 2025-01-08 RX ADMIN — ACETAMINOPHEN 975 MILLIGRAM(S): 80 SOLUTION/ DROPS ORAL at 20:22

## 2025-01-08 RX ADMIN — ONDANSETRON 4 MILLIGRAM(S): 4 TABLET ORAL at 04:48

## 2025-01-08 RX ADMIN — SODIUM CHLORIDE 1000 MILLILITER(S): 9 INJECTION, SOLUTION INTRAVENOUS at 11:50

## 2025-01-08 RX ADMIN — ACETAMINOPHEN 975 MILLIGRAM(S): 80 SOLUTION/ DROPS ORAL at 20:52

## 2025-01-08 RX ADMIN — Medication 600 MILLIGRAM(S): at 17:42

## 2025-01-08 RX ADMIN — Medication 108 GRAM(S): at 00:11

## 2025-01-08 RX ADMIN — Medication 600 MILLIGRAM(S): at 18:12

## 2025-01-08 RX ADMIN — KETOROLAC TROMETHAMINE 30 MILLIGRAM(S): 30 INJECTION INTRAMUSCULAR; INTRAVENOUS at 10:53

## 2025-01-08 RX ADMIN — Medication 600 MILLIGRAM(S): at 23:47

## 2025-01-08 RX ADMIN — Medication 167 MILLIUNIT(S)/MIN: at 12:02

## 2025-01-08 NOTE — OB PROVIDER LABOR PROGRESS NOTE - NS_OBIHIFHRDETAILS_OBGYN_ALL_OB_FT
150 / mod diana / +decels / + accels
SHR 150s, + moderate variabilit, + accels, occasional late decel and early decel.
Cat I
FHR 140s, + moderate variability, + accels, no decels,
FHR 160s + moderate variability + accels, early decels
Baseline 135, mod variability +accels -decels
FHR 140s, + moderate varibility, + accels, no decels,
baseline 130, mod variability, +accels, no decels

## 2025-01-08 NOTE — OB PROVIDER LABOR PROGRESS NOTE - NS_OBIHICONTRACTIONPATTERNDETAILS_OBGYN_ALL_OB_FT
contractions q 2 minutes
contractions q 2 minutes
+ regular contractions q 2-4 minutes
q2-4 minutes
q 1-3mins
contractions q 2 minutes
q5min irregular

## 2025-01-08 NOTE — OB PROVIDER LABOR PROGRESS NOTE - ASSESSMENT
Patient is a 38 y/o P0 at 37wks 5 days admitted for inducation of labor due to suspected cholestasis of pregnancy, + itching, decreased fetal movement, elevated LFTs, bile acids pending  Category 1 tracing  LD consent in the chart, patient would accept a blood transfusion if needed.   Patient stable  Continue current managment and continue to monitor.  Ashley Arriola MD 
38 y/o P0 at 37wks 4 days with elevated LFTS and presumed cholestasis of pregnancy  S/P IOL with po cytotec and cervical balloon, currently on pitocin   Category 1 tracing  4 cm dilated  S/P epidural   Continue current management, continue to labor  Ashley Arriola MD   
39y   at 37w6  IOL for decFM, ICP.     - Continue EFM/ TOCO  - sp PO cytotec, CB, SROM 80  - Continue pitocin, pitocin at 16  - Epidural in place, top off prn     To be D/w Dr. Flako Reynolds, PGY2
Patient P0 Category 2 tracing, and pushing  Patient stable.  2-0 chromic placed for vaginal laceration at introitus. good hemostasis  Patient declined VAVD at this time  Continue to labor and monitor  Ashley Arriola MD 
Patient fully dilated   Category 2 tracing  patient starting to push. Continue pitocin  Pt has a history of sciatica, otpimizing postioning for pushing, foot pedals, and leg support   continue to monitor  Ashley Arriola MD 
A/P:  -EFM: resuscitative measures prn  -cholestasis (AST/ALT 66/223)  -continue Amp  -augment with pit  -PPH: []2u on hold  -Anticipate       d/w Dr. Arriola     
P0 @ 37w5d admitted for IOL for presumed ICP, has been receiving cytotec, now 1-2/50/-3. Discussed placement of cervical balloon, pt strongly desires epidural prior to placement. Cat I tracing. Maternal vitals stable.   - anesthesia for epidural placement   - will place CB after epidural  - cEFM/toco 
39F P0 at 37wks 4 days with elevated LFTS and presumed cholestasis of pregnancy s/p IOL with PO cytotec and cervical balloon, currently on pitocin.    -Cervical changes noted: 5/70/-3  -SROM clear fluid at 8pm  -Pitocin at 12mu, continue to uptitrate when able  -GBS prophylaxis: Ampicillin  -Continue to monitor EFM/toco    Discussed with Dr. Flako Moore PA-C
39y  at 37w5 IOL for dec FM, cholestasis.     - Continue EFM/ TOCO  - Continue PO cytotec  - Cervical balloon with 60cc in each balloon placed without issue   - Epidural in place and effective    Plan per Dr. Andrew Reynolds, PGY2  
40 yo  @37w6d admitted for decFM, ICP.     - GBS positive on Amp  - Epi for pain  - EFM / TOCO: Resuscitative measures PRN, bolus given and patient repositioned to right lateral  - Epi for pain   - Expect     DW Dr. Flako Macedo PA-C

## 2025-01-08 NOTE — OB RN DELIVERY SUMMARY - NSSELHIDDEN_OBGYN_ALL_OB_FT
[NS_DeliveryAttending1_OBGYN_ALL_OB_FT:[id number]] [NS_DeliveryAttending1_OBGYN_ALL_OB_FT:[id number]==],[NS_DeliveryRN_OBGYN_ALL_OB_FT:[id number]],[NS_CirculateRN2_OBGYN_ALL_OB_FT:[id number]==]

## 2025-01-08 NOTE — OB RN DELIVERY SUMMARY - BABY A: APGAR 1 MIN MUSCLE TONE, DELIVERY
D: Patient presented 6/26 with 1 day of melena c/f GIB. Hx. nonischemic cardiomyopathy status post HeartMate 3 LVAD (4/20/2021) complicated by RV failure requiring prolonged dobutamine wean, HIV on Biktarvy, paroxysmal A. fib/NSVT, SHLOMO, CKD.  I/A: A&Ox4. VSSA on RA. SB/SR 50s-60s w/occasional PVCs. LVAD #s wnl, no alarms. Denies pain. Adequate uop. No BM overnight. SBA. Appeared to rest comfortably overnight.   P: Continue to monitor and notify Cards 2 with questions/concerns.      (1) flexion of extremities

## 2025-01-08 NOTE — OB NEONATOLOGY/PEDIATRICIAN DELIVERY SUMMARY - NSPEDSNEONOTESA_OBGYN_ALL_OB_FT
Code 100 called for baby being stunned after delivery. Baby boy born at 37.6 wks via  on 2025 at 0853 to a 40 y/o , O+ blood type mother. Maternal history of cholestasis, elevated LFTs, thyroidectomy (for nodules in 2017 on Synthroid, no Graves'). No significant prenatal history. PNL nr/immune/-, GBS + on 2025 (Ampicillin X 10). SROM at 1930 with clear fluids. ROM 13h 23m. Mother received RSV vaccine more than 2 weeks prior to delivery Baby emerged stunned with HR less than 100, weak cry, poor muscle tone, blue, was w/d/s/s, PPV initiated by NICU NP, given for approximately 20 seconds  with improvement, APGARS of 4/9, tight nuchal cord x2. Mom would like to breast and bottle feed, consents Hep B and consents circ. T max: 36.8 C, EOS 0.09. PMD: Dr. Lorri Silverio MD - Pediatrician in [place], [place].    Physical Exam:  Gen: no acute distress, +grimace  HEENT:  + molding, +cephalohematoma/caput, anterior fontanel open soft and flat, nondysmorphic facies, no cleft lip/palate, ears normal set, no ear pits or tags, nares clinically patent  Resp: Normal respiratory effort without grunting or retractions, good air entry b/l, clear to auscultation bilaterally  Cardio: Present S1/S2, regular rate and rhythm, no murmurs  Abd: soft, non tender, non distended, umbilical cord with 3 vessels  Neuro: +palmar and plantar grasp, +suck, +Louie, normal tone  Extremities/musculoskeletal: negative Arndt and Ortolani maneuvers, moving all extremities, no clavicular crepitus or stepoff  Skin: pink, warm, +congenital dermal melanocytosis to buttock b/l  Genitals: Normal male anatomy, testicles palpable in scrotum b/l, +hydrocele b/l, José 1, anus patent

## 2025-01-08 NOTE — OB PROVIDER LABOR PROGRESS NOTE - NS_SUBJECTIVE/OBJECTIVE_OBGYN_ALL_OB_FT
OBGYN Attending Note:   Patinet fully dilated and pushing. FHR 160s +moderate variability, + accels, + early decels.   FHR 150s, earlier, urine concentrated, patient afebrile.  Fluid bolus 500c at this time.   Discussed possible vacuum delivery to expedite delivery based on category 2 tracing or maternal exhaustion.  Continue to labor.  Ashley Arriola MD
Patient seen at bedside for cervical exam.
Pt evaluated at bedside for complaints of vaginal pressure.    ICU Vital Signs Last 24 Hrs  T(C): 36.7 (07 Jan 2025 23:00), Max: 36.7 (07 Jan 2025 10:00)  T(F): 98.06 (07 Jan 2025 23:00), Max: 98.06 (07 Jan 2025 10:00)  HR: 71 (07 Jan 2025 23:31) (68 - 124)  BP: 119/70 (07 Jan 2025 23:00) (96/56 - 137/68)  RR: 18 (07 Jan 2025 05:00) (18 - 18)  SpO2: 98% (07 Jan 2025 23:31) (85% - 100%)
Pt seen for cervical exam s/p cervical balloon displaced
At bedside for VE, has been receiving cytotec.     Vital Signs Last 24 Hrs  T(C): 36.8 (06 Jan 2025 21:30), Max: 36.8 (06 Jan 2025 18:33)  T(F): 98.24 (06 Jan 2025 21:30), Max: 98.24 (06 Jan 2025 18:33)  HR: 82 (07 Jan 2025 03:15) (72 - 123)  BP: 137/68 (07 Jan 2025 03:15) (112/69 - 137/68)  RR: 18 (06 Jan 2025 18:33) (18 - 18)  SpO2: 98% (07 Jan 2025 03:15) (87% - 100%)
OBGYN Attending Note:  Patient is a 38 y/o P0 at 37wks 5 days admitted for inducation of labor due to suspected cholestasis of pregnancy, + itching, decreased fetal movement, elevated LFTs, bile acids pending  Paitent was admitted by Dr. Morales who counseled the patient regarding  and primary elctive C/S since samantha had expressed an interest in potentially delivering via primary elective C/S. Patient prefrs to try fo an . Patinet started on buccal cytotec and cervical baloon
OBGYN Attending Note: Agree with above   Patinet had a category 2 tracing, +decels, at 8 cm. +Resusitative measures administered +fluid bolus, repositioning, and tracing improved.  Patinet feeling pressure. Patient examined and cervix 10cm/100%/+1/Vtx/R
OBGYN attending Note:  38 y/o P0 at 37wks 4 days with elevated LFTS and presumed cholestasis of pregnancy  S/P IOL with po cytotec and cervical balloon, currently on pitocin   Patient examined cervix 4 cm/50%/-3/Vtx/I   FHR 140s, + moderate variability, + accels, no decels, contractions q 2 minutes
OBGYN attending Note:  Patient fully dilated and pushing for approximately 2 hours. Jose Raul received another 500cc bolus   +vagnial tear of a vaginal band while pushing at the introitus, pressure applied, + 100cc blood loss.   Jose Raul wanted a top off, and a 2-0 chromic figure of 8 placed to tamponade the laceration. Good hemostasis.  Jose Raul offered a VAVD, but prefers to keep pushing at this time  Pelvic exam Cx 10cm/100cc/+ 2 with a push/Vtx/R
Patient seen and evaluated at bedside for recurrent variables / late decelerations. Patient reports increased pain and pressure.     ICU Vital Signs Last 24 Hrs  T(C): 36.8 (08 Jan 2025 01:01), Max: 36.8 (08 Jan 2025 01:01)  T(F): 98.24 (08 Jan 2025 01:01), Max: 98.24 (08 Jan 2025 01:01)  HR: 77 (08 Jan 2025 04:36) (65 - 124)  BP: 107/67 (08 Jan 2025 04:01) (96/56 - 133/82)  RR: 18 (07 Jan 2025 05:00) (18 - 18)  SpO2: 100% (08 Jan 2025 04:36) (85% - 100%)
Patient seen at bedside for cervical balloon placement

## 2025-01-08 NOTE — OB PROVIDER DELIVERY SUMMARY - NSPROVIDERDELIVERYNOTE_OBGYN_ALL_OB_FT
Normal vaginal delivery over midline episiotomy  CAN x 2 tight   Placenta spontaneous  Episiotomy repaired in standard fashion  No complications   VMuoio

## 2025-01-08 NOTE — OB RN DELIVERY SUMMARY - NS_SEPSISRSKCALC_OBGYN_ALL_OB_FT
EOS calculated successfully. EOS Risk Factor: 0.5/1000 live births ([facility] national incidence); GA=37w6d; Temp=98.24; ROM=13.417; GBS='Positive'; Antibiotics='GBS specific antibiotics > 2 hrs prior to birth'

## 2025-01-09 ENCOUNTER — APPOINTMENT (OUTPATIENT)
Dept: OBGYN | Facility: CLINIC | Age: 40
End: 2025-01-09

## 2025-01-09 RX ORDER — LEVOTHYROXINE SODIUM 175 UG/1
137 TABLET ORAL DAILY
Refills: 0 | Status: DISCONTINUED | OUTPATIENT
Start: 2025-01-09 | End: 2025-01-10

## 2025-01-09 RX ORDER — SENNOSIDES 8.6 MG/1
2 TABLET, FILM COATED ORAL DAILY
Refills: 0 | Status: DISCONTINUED | OUTPATIENT
Start: 2025-01-09 | End: 2025-01-10

## 2025-01-09 RX ADMIN — ACETAMINOPHEN 975 MILLIGRAM(S): 80 SOLUTION/ DROPS ORAL at 09:00

## 2025-01-09 RX ADMIN — CALCITRIOL 0.25 MICROGRAM(S): 0.5 CAPSULE, LIQUID FILLED ORAL at 13:05

## 2025-01-09 RX ADMIN — ACETAMINOPHEN 975 MILLIGRAM(S): 80 SOLUTION/ DROPS ORAL at 14:59

## 2025-01-09 RX ADMIN — Medication 600 MILLIGRAM(S): at 00:17

## 2025-01-09 RX ADMIN — Medication 600 MILLIGRAM(S): at 11:24

## 2025-01-09 RX ADMIN — Medication 600 MILLIGRAM(S): at 18:05

## 2025-01-09 RX ADMIN — SENNOSIDES 2 TABLET(S): 8.6 TABLET, FILM COATED ORAL at 13:05

## 2025-01-09 RX ADMIN — ACETAMINOPHEN 975 MILLIGRAM(S): 80 SOLUTION/ DROPS ORAL at 02:24

## 2025-01-09 RX ADMIN — ACETAMINOPHEN 975 MILLIGRAM(S): 80 SOLUTION/ DROPS ORAL at 02:54

## 2025-01-09 RX ADMIN — LEVOTHYROXINE SODIUM 137 MICROGRAM(S): 175 TABLET ORAL at 06:17

## 2025-01-09 RX ADMIN — ACETAMINOPHEN 975 MILLIGRAM(S): 80 SOLUTION/ DROPS ORAL at 09:30

## 2025-01-09 RX ADMIN — ACETAMINOPHEN 975 MILLIGRAM(S): 80 SOLUTION/ DROPS ORAL at 15:30

## 2025-01-09 RX ADMIN — ACETAMINOPHEN 975 MILLIGRAM(S): 80 SOLUTION/ DROPS ORAL at 20:55

## 2025-01-09 RX ADMIN — Medication 600 MILLIGRAM(S): at 06:17

## 2025-01-09 RX ADMIN — Medication 1 TABLET(S): at 11:24

## 2025-01-09 RX ADMIN — ACETAMINOPHEN 975 MILLIGRAM(S): 80 SOLUTION/ DROPS ORAL at 21:40

## 2025-01-09 RX ADMIN — Medication 600 MILLIGRAM(S): at 06:47

## 2025-01-09 RX ADMIN — Medication 600 MILLIGRAM(S): at 18:35

## 2025-01-09 RX ADMIN — Medication 600 MILLIGRAM(S): at 12:00

## 2025-01-09 RX ADMIN — Medication 30 MILLILITER(S): at 11:29

## 2025-01-09 NOTE — PROGRESS NOTE ADULT - SUBJECTIVE AND OBJECTIVE BOX
OB Progress Note:  PPD#1    S: 40yo  PPD#1 s/p . Patient feels well. Pain is well controlled. She is tolerating a regular diet. Not passing flatus and feels constipated. She is voiding spontaneously but states she has had multiple episodes of urinary incontinence since delivery. Able to ambulate but reports generalized soreness. Denies CP/SOB. Denies lightheadedness/dizziness. Denies N/V.    O:  Vitals:  Vital Signs Last 24 Hrs  T(C): 36.7 (2025 06:07), Max: 37 (2025 21:27)  T(F): 98.1 (2025 06:07), Max: 98.6 (2025 21:27)  HR: 70 (2025 06:07) (62 - 99)  BP: 103/69 (2025 06:07) (86/56 - 142/82)  BP(mean): --  RR: 18 (2025 06:07) (18 - 18)  SpO2: 96% (2025 06:07) (79% - 100%)    Parameters below as of 2025 06:07  Patient On (Oxygen Delivery Method): room air        MEDICATIONS  (STANDING):  acetaminophen     Tablet .. 975 milliGRAM(s) Oral <User Schedule>  calcitriol   Capsule 0.25 MICROGram(s) Oral daily  Calcium 600 mg and vitamin D 10 mcg 2 Tablet(s) 2 Tablet(s) Oral daily  diphtheria/tetanus/pertussis (acellular) Vaccine (Adacel) 0.5 milliLiter(s) IntraMuscular once  ibuprofen  Tablet. 600 milliGRAM(s) Oral every 6 hours  levothyroxine 137 MICROGram(s) Oral daily  misoprostol Oral Solution 60 MICROGram(s) Oral every 2 hours  oxytocin Infusion 167 milliUNIT(s)/Min (167 mL/Hr) IV Continuous <Continuous>  prenatal multivitamin 1 Tablet(s) Oral daily  sodium chloride 0.9% lock flush 3 milliLiter(s) IV Push every 8 hours    MEDICATIONS  (PRN):  benzocaine 20%/menthol 0.5% Spray 1 Spray(s) Topical every 6 hours PRN for Perineal discomfort  dibucaine 1% Ointment 1 Application(s) Topical every 6 hours PRN Perineal discomfort  diphenhydrAMINE 25 milliGRAM(s) Oral every 6 hours PRN Pruritus  hydrocortisone 1% Cream 1 Application(s) Topical every 6 hours PRN Moderate Pain (4-6)  lanolin Ointment 1 Application(s) Topical every 6 hours PRN nipple soreness  magnesium hydroxide Suspension 30 milliLiter(s) Oral two times a day PRN Constipation  oxyCODONE    IR 5 milliGRAM(s) Oral every 3 hours PRN Moderate to Severe Pain (4-10)  oxyCODONE    IR 5 milliGRAM(s) Oral once PRN Moderate to Severe Pain (4-10)  pramoxine 1%/zinc 5% Cream 1 Application(s) Topical every 4 hours PRN Moderate Pain (4-6)  simethicone 80 milliGRAM(s) Chew every 4 hours PRN Gas  witch hazel Pads 1 Application(s) Topical every 4 hours PRN Perineal discomfort      Labs:  Blood type: O Positive  Rubella IgG: RPR: Negative                          11.7   11.91[H] >-----------< 263    (  @ 16:00 )             36.2    25 @ 16:00      135  |  102  |  9   ----------------------------<  107[H]  4.3   |  20[L]  |  0.44[L]        Ca    9.1      2025 16:00    TPro  7.2  /  Alb  3.4  /  TBili  0.5  /  DBili  x   /  AST  66[H]  /  ALT  223[H]  /  AlkPhos  143[H]  25 @ 16:00          Physical Exam:  General: NAD  Abdomen: soft, non-tender, non-distended, fundus firm  Vaginal: Lochia wnl  Extremities: No erythema/edema

## 2025-01-09 NOTE — PROVIDER CONTACT NOTE (OTHER) - ASSESSMENT
Lower Lt and Rt lower extremities swollen and hard on touching. Dr Zaragoza notified to check on Pt.

## 2025-01-09 NOTE — CHART NOTE - NSCHARTNOTEFT_GEN_A_CORE
Assessed pt after call from RN that she was experiencing 1 day of lower extremity swelling. Patient reports that after delivery she noticed increased b/l lower extremity pain and swelling including both thighes. Both her legs seem equally swollen and pain is mostly in her upper thighs. She denies noticing redness or warmth to b/l lower ext. Patient also reports episodes of urinary incontinence and that she leaks urine when she stands up to go to the bathroom. However, she feels this is slightly improved over the course of the day.     Vital Signs Last 24 Hrs  T(C): 36.4 (2025 21:14), Max: 37.2 (2025 12:52)  T(F): 97.5 (:14), Max: 99 (2025 12:52)  HR: 69 (:14) (69 - 82)  BP: 139/84 (2025 21:14) (103/69 - 139/84)  RR: 18 (:14) (18 - 18)  SpO2: 98% (:14) (96% - 98%)    Exam:   Gen: well appearing, NAD  Lower ext: b/l 1+ pittign lower extremity swelling, LE equal in size, no erythema, no calf tenderness   Abd: fundus firm  Pelvic: lochia wnl on pad, no active urine leakage     A/P: 38yo PPD#1 s/p  with . Differential diagnosis for b/l lower extremity swelling includes DVT vs postpartum swelling. Less likely DVT given LE equal in size, no isolated calf tenderness and no erythema. B/l lower extremity venous dopplers ordered. Continue to monitor. Urinary leaking likely due to weakened pelvic floor muscles postpartum. Counseled patient on strengthening pelvic floor muscles outpatient.     Danita Zimmerman PGY1  Dr. Reid, attending, notified   D/w Dr. Frias, service attending

## 2025-01-09 NOTE — PROGRESS NOTE ADULT - ATTENDING COMMENTS
Patient doing well, out of bed pt c/o incontinence worse when feels urge and waits and chronic constipation.   No HA, CP, SOB  No heavy vaginal bleeding (VB)/normal lochia    ICU Vital Signs Last 24 Hrs  T(C): 37.2 (2025 12:52), Max: 37.2 (2025 12:52)  T(F): 99 (2025 12:52), Max: 99 (2025 12:52)  HR: 70 (2025 12:52) (64 - 93)  BP: 119/63 (2025 12:52) (94/62 - 142/82)  RR: 18 (2025 12:52) (18 - 18)  SpO2: 97% (2025 12:52) (96% - 98%)    O2 Parameters below as of 2025 12:52  Patient On (Oxygen Delivery Method): room air      NAD, walking in room  Abd: notender  ext: + edema    PPD # 1 s/p     supportive care   discussed urinary incontinence is common rec timed voids Q 2-3 hours, avoid waiting if have urge and discussed kegal's                                          exercises. Discussed timing and indication for pelvic PT    Chronic constipation in antepartum, milk of magnesia not helping, pt agreeable to senna  today  increase hydration, fruits and vegetables    Patient seen and evaluated by me. I agree with resident note unless otherwise stated.   Routine postpartum care, regular diet as tolerated, ambulate and pain control as needed.     ELLEN Reid MD  Attending

## 2025-01-09 NOTE — PROGRESS NOTE ADULT - ASSESSMENT
A/P: 38yo PPD#1 s/p .  Patient is stable and doing well post-partum.     #constipation  - will order senna     #urinary incontinence  - counseled patient on need for pelvic floor PT   - remainder of plan to be discussed with attending     #transaminitis   - AST/ALT 66/223 in setting of ICP     #postpartum  - Hct: 36.2  - Pain well controlled, continue current pain regimen  - Increase ambulation, SCDs when not ambulating  - Continue regular diet    A Sacks, PGY1

## 2025-01-10 ENCOUNTER — APPOINTMENT (OUTPATIENT)
Dept: ANTEPARTUM | Facility: CLINIC | Age: 40
End: 2025-01-10

## 2025-01-10 ENCOUNTER — TRANSCRIPTION ENCOUNTER (OUTPATIENT)
Age: 40
End: 2025-01-10

## 2025-01-10 VITALS
HEART RATE: 73 BPM | SYSTOLIC BLOOD PRESSURE: 106 MMHG | OXYGEN SATURATION: 98 % | RESPIRATION RATE: 18 BRPM | TEMPERATURE: 99 F | DIASTOLIC BLOOD PRESSURE: 60 MMHG

## 2025-01-10 PROCEDURE — 85027 COMPLETE CBC AUTOMATED: CPT

## 2025-01-10 PROCEDURE — 82239 BILE ACIDS TOTAL: CPT

## 2025-01-10 PROCEDURE — 59050 FETAL MONITOR W/REPORT: CPT

## 2025-01-10 PROCEDURE — 93970 EXTREMITY STUDY: CPT | Mod: 26

## 2025-01-10 PROCEDURE — 86900 BLOOD TYPING SEROLOGIC ABO: CPT

## 2025-01-10 PROCEDURE — 86850 RBC ANTIBODY SCREEN: CPT

## 2025-01-10 PROCEDURE — 86901 BLOOD TYPING SEROLOGIC RH(D): CPT

## 2025-01-10 PROCEDURE — 80053 COMPREHEN METABOLIC PANEL: CPT

## 2025-01-10 PROCEDURE — 93970 EXTREMITY STUDY: CPT

## 2025-01-10 PROCEDURE — 86780 TREPONEMA PALLIDUM: CPT

## 2025-01-10 RX ORDER — IBUPROFEN 200 MG
1 TABLET ORAL
Qty: 0 | Refills: 0 | DISCHARGE
Start: 2025-01-10

## 2025-01-10 RX ORDER — ACETAMINOPHEN 80 MG/.8ML
3 SOLUTION/ DROPS ORAL
Qty: 0 | Refills: 0 | DISCHARGE
Start: 2025-01-10

## 2025-01-10 RX ADMIN — ACETAMINOPHEN 975 MILLIGRAM(S): 80 SOLUTION/ DROPS ORAL at 08:56

## 2025-01-10 RX ADMIN — LEVOTHYROXINE SODIUM 137 MICROGRAM(S): 175 TABLET ORAL at 05:28

## 2025-01-10 RX ADMIN — Medication 600 MILLIGRAM(S): at 13:05

## 2025-01-10 RX ADMIN — Medication 600 MILLIGRAM(S): at 00:03

## 2025-01-10 RX ADMIN — Medication 1 TABLET(S): at 12:33

## 2025-01-10 RX ADMIN — Medication 600 MILLIGRAM(S): at 12:33

## 2025-01-10 RX ADMIN — ACETAMINOPHEN 975 MILLIGRAM(S): 80 SOLUTION/ DROPS ORAL at 09:30

## 2025-01-10 RX ADMIN — CALCITRIOL 0.25 MICROGRAM(S): 0.5 CAPSULE, LIQUID FILLED ORAL at 12:32

## 2025-01-10 RX ADMIN — SENNOSIDES 2 TABLET(S): 8.6 TABLET, FILM COATED ORAL at 12:36

## 2025-01-10 RX ADMIN — ACETAMINOPHEN 975 MILLIGRAM(S): 80 SOLUTION/ DROPS ORAL at 15:16

## 2025-01-10 RX ADMIN — Medication 600 MILLIGRAM(S): at 00:08

## 2025-01-10 RX ADMIN — Medication 600 MILLIGRAM(S): at 05:28

## 2025-01-10 NOTE — DISCHARGE NOTE OB - CARE PROVIDER_API CALL
Nurys Morales  Obstetrics and Gynecology  [address], [address]  [place] [address]  Phone: [phone]  Fax: [phone]  Follow Up Time:

## 2025-01-10 NOTE — PROGRESS NOTE ADULT - ATTENDING COMMENTS
38yo PPD#2 s/p .  Patient is stable and doing well post-partum.     pain well controlled  tolerating po, voiding, ambulating  Complaining of urinary incontinence and LE leg pain    Gen: NAD  Abd: soft, NT, ND  Ext: swollen but NTBL     Will follow up lower extremity dopplers to evaluate for LE DVT   discussed shock bladder and recommend pelvic floor PT postpartum   If LE dopplers negative, will dc home     MD Antione

## 2025-01-10 NOTE — PROGRESS NOTE ADULT - ASSESSMENT
A/P: 38yo PPD#2 s/p .  Patient is stable and doing well post-partum.     #urinary incontinence  - counseled patient on need for pelvic floor PT   recommended pt can follow up with her obgyn outpatient if problem persists    #transaminitis   - AST/ALT 66/223 in setting of ICP     #postpartum  - Hct: 36.2  - Pain well controlled, continue current pain regimen  - Increase ambulation, SCDs when not ambulating  - Continue regular diet    Danita Zimmerman PGY1

## 2025-01-10 NOTE — DISCHARGE NOTE OB - CARE PLAN
Principal Discharge DX:	 (normal spontaneous vaginal delivery)  Assessment and plan of treatment:	Make your follow-up appointment with your doctor in 2 weeks for an incision check and in 6 weeks for a post-partum check. No heavy lifting, driving, or strenuous activity for 6 weeks. Nothing per vagina such as tampons, intercourse, douches, or tub baths for 6 weeks or until you see your doctor. Call your doctor with any signs and symptoms of infection such as fever, chills, nausea, or vomiting. Call your doctor if you're unable to tolerate food, increase in vaginal bleeding, or have difficulty urinating. Call your doctor if you have pain that is not relieved by your prescribed medications. Notify your doctor with any other concerns.    Call [phone] if you have any of these concerns in the next 6 weeks   1 Principal Discharge DX:	 (normal spontaneous vaginal delivery)  Assessment and plan of treatment:	Make your follow-up appointment  in 6 weeks for a post-partum check. No heavy lifting, driving, or strenuous activity for 6 weeks. Nothing per vagina such as tampons, intercourse, douches, or tub baths for 6 weeks or until you see your doctor. Call your doctor with any signs and symptoms of infection such as fever, chills, nausea, or vomiting. Call your doctor if you're unable to tolerate food, increase in vaginal bleeding, or have difficulty urinating. Call your doctor if you have pain that is not relieved by your prescribed medications. Notify your doctor with any other concerns.    Call [phone] if you have any of these concerns in the next 6 weeks

## 2025-01-10 NOTE — DISCHARGE NOTE OB - HOSPITAL COURSE
40 y/o  s/p     PP c/b urinary incontinence, to see pelvic floor postpartum   s/p LLE dopplers to do LE swelling, negative   pt discharge home in stable condition

## 2025-01-10 NOTE — DISCHARGE NOTE OB - PLAN OF CARE
Make your follow-up appointment with your doctor in 2 weeks for an incision check and in 6 weeks for a post-partum check. No heavy lifting, driving, or strenuous activity for 6 weeks. Nothing per vagina such as tampons, intercourse, douches, or tub baths for 6 weeks or until you see your doctor. Call your doctor with any signs and symptoms of infection such as fever, chills, nausea, or vomiting. Call your doctor if you're unable to tolerate food, increase in vaginal bleeding, or have difficulty urinating. Call your doctor if you have pain that is not relieved by your prescribed medications. Notify your doctor with any other concerns.    Call [phone] if you have any of these concerns in the next 6 weeks Make your follow-up appointment  in 6 weeks for a post-partum check. No heavy lifting, driving, or strenuous activity for 6 weeks. Nothing per vagina such as tampons, intercourse, douches, or tub baths for 6 weeks or until you see your doctor. Call your doctor with any signs and symptoms of infection such as fever, chills, nausea, or vomiting. Call your doctor if you're unable to tolerate food, increase in vaginal bleeding, or have difficulty urinating. Call your doctor if you have pain that is not relieved by your prescribed medications. Notify your doctor with any other concerns.    Call [phone] if you have any of these concerns in the next 6 weeks

## 2025-01-10 NOTE — DISCHARGE NOTE OB - MATERIALS PROVIDED
[facility] [place] Screening Program/Breastfeeding Log/Bottle Feeding Log/Breastfeeding Mother’s Support Group Information/Guide to Postpartum Care/[facility] Hearing Screen Program/Back To Sleep Handout/Shaken Baby Prevention Handout/Breastfeeding Guide and Packet/Birth Certificate Instructions/Discharge Medication Information for Patients and Families Pocket Guide

## 2025-01-10 NOTE — DISCHARGE NOTE OB - PATIENT PORTAL LINK FT
You can access the FollowMyHealth Patient Portal offered by [facility] by registering at the following website: [email or web address]. By joining "[email or web address]’s FollowMyHealth portal, you will also be able to view your health information using other applications (apps) compatible with our system.

## 2025-01-10 NOTE — DISCHARGE NOTE OB - FINANCIAL ASSISTANCE
[facility] provides services at a reduced cost to those who are determined to be eligible through [facility]’s financial assistance program. Information regarding [facility]’s financial assistance program can be found by going to [email or web address] or by calling [phone].

## 2025-01-10 NOTE — PROGRESS NOTE ADULT - SUBJECTIVE AND OBJECTIVE BOX
Patient seen and examined at bedside. Overnight, patient was seen due to complaints of b/l lower extremity pain/swelling and urinary leakage with ambulation. Pain well controlled with medication. Patient is ambulating, voiding spontaneously, passing gas, and tolerating regular diet. Denies CP, SOB, N/V, HA, dizziness or lightheadedness.    Vital Signs Last 24 Hours  T(C): 36.4 (01-09-25 @ 21:14), Max: 37.2 (01-09-25 @ 12:52)  HR: 69 (01-09-25 @ 21:14) (69 - 70)  BP: 139/84 (01-09-25 @ 21:14) (119/63 - 139/84)  RR: 18 (01-09-25 @ 21:14) (18 - 18)  SpO2: 98% (01-09-25 @ 21:14) (97% - 98%)    Physical Exam:  General: NAD  CV: clinically well perfused  Pulm: breathing comfortably on room air   Abdomen: Soft, appropriately-tender, non-distended, fundus firm  Pelvic: Lochia wnl on pad  Lower extremities: no calf tenderness bilaterally, no erythema, 1+ pitting edema b/l     Labs:    Blood Type: O Positive  Antibody Screen: Negative  RPR: Negative               11.7   11.91 )-----------( 263      ( 01-06 @ 16:00 )             36.2         MEDICATIONS  (STANDING):  acetaminophen     Tablet .. 975 milliGRAM(s) Oral <User Schedule>  calcitriol   Capsule 0.25 MICROGram(s) Oral daily  Calcium 600 mg and vitamin D 10 mcg 2 Tablet(s) 2 Tablet(s) Oral daily  diphtheria/tetanus/pertussis (acellular) Vaccine (Adacel) 0.5 milliLiter(s) IntraMuscular once  ibuprofen  Tablet. 600 milliGRAM(s) Oral every 6 hours  levothyroxine 137 MICROGram(s) Oral daily  misoprostol Oral Solution 60 MICROGram(s) Oral every 2 hours  oxytocin Infusion 167 milliUNIT(s)/Min (167 mL/Hr) IV Continuous <Continuous>  prenatal multivitamin 1 Tablet(s) Oral daily  senna 2 Tablet(s) Oral daily  sodium chloride 0.9% lock flush 3 milliLiter(s) IV Push every 8 hours    MEDICATIONS  (PRN):  benzocaine 20%/menthol 0.5% Spray 1 Spray(s) Topical every 6 hours PRN for Perineal discomfort  dibucaine 1% Ointment 1 Application(s) Topical every 6 hours PRN Perineal discomfort  diphenhydrAMINE 25 milliGRAM(s) Oral every 6 hours PRN Pruritus  hydrocortisone 1% Cream 1 Application(s) Topical every 6 hours PRN Moderate Pain (4-6)  lanolin Ointment 1 Application(s) Topical every 6 hours PRN nipple soreness  magnesium hydroxide Suspension 30 milliLiter(s) Oral two times a day PRN Constipation  oxyCODONE    IR 5 milliGRAM(s) Oral every 3 hours PRN Moderate to Severe Pain (4-10)  oxyCODONE    IR 5 milliGRAM(s) Oral once PRN Moderate to Severe Pain (4-10)  pramoxine 1%/zinc 5% Cream 1 Application(s) Topical every 4 hours PRN Moderate Pain (4-6)  simethicone 80 milliGRAM(s) Chew every 4 hours PRN Gas  witch hazel Pads 1 Application(s) Topical every 4 hours PRN Perineal discomfort

## 2025-01-10 NOTE — CHART NOTE - NSCHARTNOTEFT_GEN_A_CORE
Patient with negative LE Doppler. Discussed results with patient and patient's mother at bedside. Encouraged increased ambulation and patient indicated understanding. All questions answered.     A Sacks, PGY1   D/w Dr. Jolly

## 2025-01-13 ENCOUNTER — APPOINTMENT (OUTPATIENT)
Dept: OBGYN | Facility: CLINIC | Age: 40
End: 2025-01-13

## 2025-01-13 ENCOUNTER — APPOINTMENT (OUTPATIENT)
Dept: ANTEPARTUM | Facility: CLINIC | Age: 40
End: 2025-01-13

## 2025-01-21 ENCOUNTER — APPOINTMENT (OUTPATIENT)
Dept: OBGYN | Facility: CLINIC | Age: 40
End: 2025-01-21

## 2025-01-21 ENCOUNTER — APPOINTMENT (OUTPATIENT)
Dept: ANTEPARTUM | Facility: CLINIC | Age: 40
End: 2025-01-21

## 2025-02-06 ENCOUNTER — NON-APPOINTMENT (OUTPATIENT)
Age: 40
End: 2025-02-06

## 2025-02-06 ENCOUNTER — APPOINTMENT (OUTPATIENT)
Dept: OBGYN | Facility: CLINIC | Age: 40
End: 2025-02-06
Payer: COMMERCIAL

## 2025-02-06 VITALS
SYSTOLIC BLOOD PRESSURE: 128 MMHG | WEIGHT: 177.38 LBS | BODY MASS INDEX: 33.49 KG/M2 | HEIGHT: 61 IN | DIASTOLIC BLOOD PRESSURE: 83 MMHG

## 2025-02-06 DIAGNOSIS — N39.46 MIXED INCONTINENCE: ICD-10-CM

## 2025-02-06 LAB
BILIRUB UR QL STRIP: NORMAL
GLUCOSE UR-MCNC: NORMAL
HCG UR QL: 0.2 EU/DL
HGB UR QL STRIP.AUTO: NORMAL
KETONES UR-MCNC: NORMAL
LEUKOCYTE ESTERASE UR QL STRIP: NORMAL
NITRITE UR QL STRIP: NORMAL
PH UR STRIP: 5.5
PROT UR STRIP-MCNC: NORMAL
SP GR UR STRIP: 1.02

## 2025-02-06 PROCEDURE — 0503F POSTPARTUM CARE VISIT: CPT

## 2025-02-11 DIAGNOSIS — N30.90 CYSTITIS, UNSPECIFIED W/OUT HEMATURIA: ICD-10-CM

## 2025-02-11 LAB — BACTERIA UR CULT: ABNORMAL

## 2025-02-11 RX ORDER — NITROFURANTOIN (MONOHYDRATE/MACROCRYSTALS) 25; 75 MG/1; MG/1
100 CAPSULE ORAL TWICE DAILY
Qty: 10 | Refills: 0 | Status: ACTIVE | COMMUNITY
Start: 2025-02-11 | End: 1900-01-01

## 2025-03-13 ENCOUNTER — APPOINTMENT (OUTPATIENT)
Dept: OBGYN | Facility: CLINIC | Age: 40
End: 2025-03-13
Payer: COMMERCIAL

## 2025-03-13 VITALS
WEIGHT: 182.13 LBS | HEIGHT: 61 IN | DIASTOLIC BLOOD PRESSURE: 88 MMHG | BODY MASS INDEX: 34.39 KG/M2 | SYSTOLIC BLOOD PRESSURE: 126 MMHG

## 2025-03-13 DIAGNOSIS — R79.89 OTHER SPECIFIED ABNORMAL FINDINGS OF BLOOD CHEMISTRY: ICD-10-CM

## 2025-03-13 DIAGNOSIS — N89.8 OTHER SPECIFIED NONINFLAMMATORY DISORDERS OF VAGINA: ICD-10-CM

## 2025-03-13 DIAGNOSIS — N39.46 MIXED INCONTINENCE: ICD-10-CM

## 2025-03-13 PROCEDURE — 0503F POSTPARTUM CARE VISIT: CPT

## 2025-03-14 LAB
ALBUMIN SERPL ELPH-MCNC: 4.1 G/DL
ALP BLD-CCNC: 76 U/L
ALT SERPL-CCNC: 16 U/L
ANION GAP SERPL CALC-SCNC: 12 MMOL/L
AST SERPL-CCNC: 18 U/L
BILIRUB SERPL-MCNC: 0.4 MG/DL
BUN SERPL-MCNC: 13 MG/DL
BV BACTERIA RRNA VAG QL NAA+PROBE: NOT DETECTED
C GLABRATA RNA VAG QL NAA+PROBE: NOT DETECTED
CALCIUM SERPL-MCNC: 9.1 MG/DL
CANDIDA RRNA VAG QL PROBE: NOT DETECTED
CHLORIDE SERPL-SCNC: 103 MMOL/L
CO2 SERPL-SCNC: 28 MMOL/L
CREAT SERPL-MCNC: 0.66 MG/DL
EGFRCR SERPLBLD CKD-EPI 2021: 113 ML/MIN/1.73M2
GLUCOSE SERPL-MCNC: 92 MG/DL
POTASSIUM SERPL-SCNC: 4.3 MMOL/L
PROT SERPL-MCNC: 7.8 G/DL
SODIUM SERPL-SCNC: 142 MMOL/L
T VAGINALIS RRNA SPEC QL NAA+PROBE: NOT DETECTED

## 2025-03-19 ENCOUNTER — TRANSCRIPTION ENCOUNTER (OUTPATIENT)
Age: 40
End: 2025-03-19

## 2025-03-19 RX ORDER — CEPHALEXIN 500 MG/1
500 TABLET ORAL 3 TIMES DAILY
Qty: 21 | Refills: 0 | Status: ACTIVE | COMMUNITY
Start: 2025-03-19 | End: 1900-01-01

## 2025-03-20 ENCOUNTER — TRANSCRIPTION ENCOUNTER (OUTPATIENT)
Age: 40
End: 2025-03-20

## 2025-04-28 ENCOUNTER — APPOINTMENT (OUTPATIENT)
Dept: UROGYNECOLOGY | Facility: CLINIC | Age: 40
End: 2025-04-28

## 2025-06-03 ENCOUNTER — APPOINTMENT (OUTPATIENT)
Dept: ANTEPARTUM | Facility: CLINIC | Age: 40
End: 2025-06-03